# Patient Record
Sex: MALE | Race: WHITE | URBAN - METROPOLITAN AREA
[De-identification: names, ages, dates, MRNs, and addresses within clinical notes are randomized per-mention and may not be internally consistent; named-entity substitution may affect disease eponyms.]

---

## 2020-10-21 ENCOUNTER — PROCEDURE (OUTPATIENT)
Dept: URBAN - METROPOLITAN AREA CLINIC 54 | Facility: CLINIC | Age: 80
End: 2020-10-21
Payer: MEDICARE

## 2020-10-21 PROCEDURE — 92134 CPTRZ OPH DX IMG PST SGM RTA: CPT | Performed by: OPHTHALMOLOGY

## 2020-10-21 PROCEDURE — 67028 INJECTION EYE DRUG: CPT | Performed by: OPHTHALMOLOGY

## 2020-10-21 ASSESSMENT — INTRAOCULAR PRESSURE
OD: 9
OS: 12

## 2020-11-18 ENCOUNTER — PROCEDURE (OUTPATIENT)
Dept: URBAN - METROPOLITAN AREA CLINIC 54 | Facility: CLINIC | Age: 80
End: 2020-11-18
Payer: MEDICARE

## 2020-11-18 PROCEDURE — 92134 CPTRZ OPH DX IMG PST SGM RTA: CPT | Performed by: OPHTHALMOLOGY

## 2020-11-18 PROCEDURE — 67028 INJECTION EYE DRUG: CPT | Performed by: OPHTHALMOLOGY

## 2020-11-18 ASSESSMENT — INTRAOCULAR PRESSURE
OD: 13
OS: 14

## 2020-12-21 ENCOUNTER — OFFICE VISIT (OUTPATIENT)
Dept: URBAN - METROPOLITAN AREA CLINIC 54 | Facility: CLINIC | Age: 80
End: 2020-12-21
Payer: MEDICARE

## 2020-12-21 PROCEDURE — 67028 INJECTION EYE DRUG: CPT | Performed by: OPHTHALMOLOGY

## 2020-12-21 PROCEDURE — 92012 INTRM OPH EXAM EST PATIENT: CPT | Performed by: OPHTHALMOLOGY

## 2020-12-21 PROCEDURE — 92134 CPTRZ OPH DX IMG PST SGM RTA: CPT | Performed by: OPHTHALMOLOGY

## 2020-12-21 ASSESSMENT — INTRAOCULAR PRESSURE
OS: 15
OD: 16

## 2020-12-21 NOTE — IMPRESSION/PLAN
Impression: Central retinal vein occlusion, left eye, with macular edema Plan: He complains  his vision OD is a little bit blurred. 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 He is also s/p Ozurdex 4/29/20. FA with sweeps from 4/13/18 showed perfused CRVO. OCT today shows no IRF/SRF OD and ERM/CME OS. We discussed the findings and natural history. We discussed his vision may be limited by macular ischemia and/or amblyopia. Based on today's findings, I recommend treatment today to reduce the risk of vision loss. He has done well with ozurdex but his IOP is elevated today. Lucentis injected OS without complication after discussing the R/IB/A in detail. We discussed trying eylea as well.  
thanks Ilia CAST 1 month OCT OU; ivania OS#1

## 2021-02-03 ENCOUNTER — PROCEDURE (OUTPATIENT)
Dept: URBAN - METROPOLITAN AREA CLINIC 54 | Facility: CLINIC | Age: 81
End: 2021-02-03
Payer: MEDICARE

## 2021-02-03 PROCEDURE — 92134 CPTRZ OPH DX IMG PST SGM RTA: CPT | Performed by: OPHTHALMOLOGY

## 2021-02-03 PROCEDURE — 67028 INJECTION EYE DRUG: CPT | Performed by: OPHTHALMOLOGY

## 2021-02-03 RX ORDER — LATANOPROST 50 UG/ML
0.005 % SOLUTION OPHTHALMIC
Qty: 5 | Refills: 2 | Status: INACTIVE
Start: 2021-02-03 | End: 2021-03-04

## 2021-02-03 RX ORDER — TIMOLOL MALEATE 5 MG/ML
0.5 % SOLUTION/ DROPS OPHTHALMIC
Qty: 5 | Refills: 2 | Status: INACTIVE
Start: 2021-02-03 | End: 2021-03-04

## 2021-02-03 ASSESSMENT — INTRAOCULAR PRESSURE
OS: 19
OD: 13

## 2021-03-03 ENCOUNTER — PROCEDURE (OUTPATIENT)
Dept: URBAN - METROPOLITAN AREA CLINIC 54 | Facility: CLINIC | Age: 81
End: 2021-03-03
Payer: MEDICARE

## 2021-03-03 PROCEDURE — 67028 INJECTION EYE DRUG: CPT | Performed by: OPHTHALMOLOGY

## 2021-03-03 PROCEDURE — 92134 CPTRZ OPH DX IMG PST SGM RTA: CPT | Performed by: OPHTHALMOLOGY

## 2021-03-03 ASSESSMENT — INTRAOCULAR PRESSURE
OD: 13
OS: 18

## 2021-03-31 ENCOUNTER — PROCEDURE (OUTPATIENT)
Dept: URBAN - METROPOLITAN AREA CLINIC 54 | Facility: CLINIC | Age: 81
End: 2021-03-31
Payer: MEDICARE

## 2021-03-31 PROCEDURE — 92134 CPTRZ OPH DX IMG PST SGM RTA: CPT | Performed by: OPHTHALMOLOGY

## 2021-03-31 PROCEDURE — 67028 INJECTION EYE DRUG: CPT | Performed by: OPHTHALMOLOGY

## 2021-03-31 ASSESSMENT — INTRAOCULAR PRESSURE
OS: 18
OD: 15

## 2021-04-28 ENCOUNTER — OFFICE VISIT (OUTPATIENT)
Dept: URBAN - METROPOLITAN AREA CLINIC 54 | Facility: CLINIC | Age: 81
End: 2021-04-28
Payer: MEDICARE

## 2021-04-28 DIAGNOSIS — H34.8120 CENTRAL RETINAL VEIN OCCLUSION, LEFT EYE, WITH MACULAR EDEMA: Primary | ICD-10-CM

## 2021-04-28 PROCEDURE — 92134 CPTRZ OPH DX IMG PST SGM RTA: CPT | Performed by: OPHTHALMOLOGY

## 2021-04-28 PROCEDURE — 99213 OFFICE O/P EST LOW 20 MIN: CPT | Performed by: OPHTHALMOLOGY

## 2021-04-28 ASSESSMENT — INTRAOCULAR PRESSURE
OD: 12
OS: 16

## 2021-04-28 NOTE — IMPRESSION/PLAN
Impression: Central retinal vein occlusion, left eye, with macular edema Plan: He has persistent but improved CME OS s/p eyles OS x 3. He is also s/p Ozurdex 4/29/20. FA with sweeps from 4/13/18 showed perfused CRVO. OCT today shows no IRF/SRF OD and ERM/CME OS. We discussed the findings and natural history. We discussed his vision may be limited by macular ischemia and/or amblyopia. We discussed observation vs treatment and the R/B of each. I recommend treatment today but he wants to defer an injection and will call us with any new visual changes. His last eylea OS was 3/31/2.  We discussed the need to monitor for NVG
thanks Ilia CAST 1 month OCT OU; poss  eylea OS

## 2021-05-06 ENCOUNTER — OFFICE VISIT (OUTPATIENT)
Dept: URBAN - METROPOLITAN AREA CLINIC 13 | Facility: CLINIC | Age: 81
End: 2021-05-06
Payer: MEDICARE

## 2021-05-06 PROCEDURE — 67028 INJECTION EYE DRUG: CPT | Performed by: OPHTHALMOLOGY

## 2021-05-06 PROCEDURE — 92134 CPTRZ OPH DX IMG PST SGM RTA: CPT | Performed by: OPHTHALMOLOGY

## 2021-05-06 ASSESSMENT — INTRAOCULAR PRESSURE
OS: 17
OD: 11

## 2021-05-06 NOTE — IMPRESSION/PLAN
Impression: Central retinal vein occlusion, left eye, with macular edema Plan: He complains of worse vision OS and has recurrent CME. He is also s/p Ozurdex 4/29/20. FA with sweeps from 4/13/18 showed perfused CRVO. OCT today shows no IRF/SRF OD and ERM/CME OS. We discussed the findings and natural history. We discussed his vision may be limited by macular ischemia and/or amblyopia. I recommend treatment today.  Eylea injected OS without complication after discussing the R/IB/A in detail. 
thanks Ilia CAST 1 month OCT OU; eylea OS

## 2021-06-09 ENCOUNTER — PROCEDURE (OUTPATIENT)
Dept: URBAN - METROPOLITAN AREA CLINIC 54 | Facility: CLINIC | Age: 81
End: 2021-06-09
Payer: MEDICARE

## 2021-06-09 PROCEDURE — 67028 INJECTION EYE DRUG: CPT | Performed by: OPHTHALMOLOGY

## 2021-06-09 PROCEDURE — 92134 CPTRZ OPH DX IMG PST SGM RTA: CPT | Performed by: OPHTHALMOLOGY

## 2021-06-09 ASSESSMENT — INTRAOCULAR PRESSURE
OS: 15
OD: 11

## 2021-07-12 ENCOUNTER — PROCEDURE (OUTPATIENT)
Dept: URBAN - METROPOLITAN AREA CLINIC 54 | Facility: CLINIC | Age: 81
End: 2021-07-12
Payer: MEDICARE

## 2021-07-12 PROCEDURE — 92134 CPTRZ OPH DX IMG PST SGM RTA: CPT | Performed by: OPHTHALMOLOGY

## 2021-07-12 PROCEDURE — 67028 INJECTION EYE DRUG: CPT | Performed by: OPHTHALMOLOGY

## 2021-07-12 ASSESSMENT — INTRAOCULAR PRESSURE
OS: 14
OD: 12

## 2021-08-11 ENCOUNTER — OFFICE VISIT (OUTPATIENT)
Dept: URBAN - METROPOLITAN AREA CLINIC 54 | Facility: CLINIC | Age: 81
End: 2021-08-11
Payer: MEDICARE

## 2021-08-11 DIAGNOSIS — H53.002 AMBLYOPIA OF LEFT EYE: ICD-10-CM

## 2021-08-11 PROCEDURE — 92134 CPTRZ OPH DX IMG PST SGM RTA: CPT | Performed by: OPHTHALMOLOGY

## 2021-08-11 PROCEDURE — 67028 INJECTION EYE DRUG: CPT | Performed by: OPHTHALMOLOGY

## 2021-08-11 PROCEDURE — 99213 OFFICE O/P EST LOW 20 MIN: CPT | Performed by: OPHTHALMOLOGY

## 2021-08-11 ASSESSMENT — INTRAOCULAR PRESSURE
OD: 12
OS: 17

## 2021-08-11 NOTE — IMPRESSION/PLAN
Impression: Central retinal vein occlusion, left eye, with macular edema Plan: He complains of slightly worse vision OD. He has persistent CME OS. He is also s/p Ozurdex 4/29/20. FA with sweeps from 4/13/18 showed perfused CRVO. OCT today shows no IRF/SRF OD and ERM/CME OS. We discussed the findings and natural history. We discussed his vision may be limited by macular ischemia and/or amblyopia and likely will not improve. We discussed treatment vs observation. Based on today's findings, I recommend treatment today to reduce the risk of vision loss. Eylea injected OS without complication after discussing the R/IB/A in detail. We will try to extend the interval and discussed stopping injections. thanks [employer] RTC 6 weeks OCT OU; eylea OS

## 2021-09-22 ENCOUNTER — PROCEDURE (OUTPATIENT)
Dept: URBAN - METROPOLITAN AREA CLINIC 54 | Facility: CLINIC | Age: 81
End: 2021-09-22
Payer: MEDICARE

## 2021-09-22 PROCEDURE — 92134 CPTRZ OPH DX IMG PST SGM RTA: CPT | Performed by: OPHTHALMOLOGY

## 2021-09-22 PROCEDURE — 67028 INJECTION EYE DRUG: CPT | Performed by: OPHTHALMOLOGY

## 2021-09-22 RX ORDER — TIMOLOL 2.56 MG/ML
0.25 % SOLUTION/ DROPS OPHTHALMIC
Qty: 0 | Refills: 0 | Status: INACTIVE
Start: 2021-09-22 | End: 2021-09-22

## 2021-09-22 RX ORDER — LATANOPROST 50 UG/ML
0.005 % SOLUTION OPHTHALMIC
Qty: 0 | Refills: 0 | Status: ACTIVE
Start: 2021-09-22

## 2021-09-22 RX ORDER — TIMOLOL MALEATE 2.5 MG/ML
0.25 % SOLUTION/ DROPS OPHTHALMIC
Qty: 0 | Refills: 0 | Status: INACTIVE
Start: 2021-09-22 | End: 2022-02-16

## 2021-09-22 ASSESSMENT — INTRAOCULAR PRESSURE
OS: 18
OD: 17

## 2021-11-03 ENCOUNTER — PROCEDURE (OUTPATIENT)
Dept: URBAN - METROPOLITAN AREA CLINIC 54 | Facility: CLINIC | Age: 81
End: 2021-11-03
Payer: MEDICARE

## 2021-11-03 PROCEDURE — 67028 INJECTION EYE DRUG: CPT | Performed by: OPHTHALMOLOGY

## 2021-11-03 PROCEDURE — 92134 CPTRZ OPH DX IMG PST SGM RTA: CPT | Performed by: OPHTHALMOLOGY

## 2021-11-03 ASSESSMENT — INTRAOCULAR PRESSURE
OD: 13
OS: 18

## 2022-01-05 ENCOUNTER — OFFICE VISIT (OUTPATIENT)
Dept: URBAN - METROPOLITAN AREA CLINIC 54 | Facility: CLINIC | Age: 82
End: 2022-01-05
Payer: MEDICARE

## 2022-01-05 PROCEDURE — 99213 OFFICE O/P EST LOW 20 MIN: CPT | Performed by: OPHTHALMOLOGY

## 2022-01-05 PROCEDURE — 92134 CPTRZ OPH DX IMG PST SGM RTA: CPT | Performed by: OPHTHALMOLOGY

## 2022-01-05 PROCEDURE — 67028 INJECTION EYE DRUG: CPT | Performed by: OPHTHALMOLOGY

## 2022-01-05 ASSESSMENT — INTRAOCULAR PRESSURE
OS: 20
OD: 12

## 2022-01-05 NOTE — IMPRESSION/PLAN
Impression: Central retinal vein occlusion, left eye, with macular edema Plan: He complains his vision OU is deteriorating. He has persistent CME OS. He is also s/p Ozurdex 4/29/20. FA with sweeps from 4/13/18 showed perfused CRVO. OCT today shows no IRF/SRF OD and ERM/CME OS. We discussed the findings and natural history. We discussed his vision may be limited by macular ischemia and/or amblyopia and likely will not improve. We discussed treatment vs observation. He wants to continue treatment. Based on today's findings, I recommend treatment today to reduce the risk of vision loss. Eylea injected OS without complication after discussing the R/IB/A in detail. We discussed stopping injections as well. thanks [employer] RTC 6 weeks OCT OU; eylea OS; then RTC 6-8 weeks FA/OCT OU (transit OS); poss eylea

## 2022-02-16 ENCOUNTER — PROCEDURE (OUTPATIENT)
Dept: URBAN - METROPOLITAN AREA CLINIC 54 | Facility: CLINIC | Age: 82
End: 2022-02-16
Payer: MEDICARE

## 2022-02-16 PROCEDURE — 67028 INJECTION EYE DRUG: CPT | Performed by: OPHTHALMOLOGY

## 2022-02-16 PROCEDURE — 92134 CPTRZ OPH DX IMG PST SGM RTA: CPT | Performed by: OPHTHALMOLOGY

## 2022-02-16 ASSESSMENT — INTRAOCULAR PRESSURE
OD: 12
OS: 20

## 2022-03-30 ENCOUNTER — OFFICE VISIT (OUTPATIENT)
Dept: URBAN - METROPOLITAN AREA CLINIC 54 | Facility: CLINIC | Age: 82
End: 2022-03-30
Payer: MEDICARE

## 2022-03-30 PROCEDURE — 99214 OFFICE O/P EST MOD 30 MIN: CPT | Performed by: OPHTHALMOLOGY

## 2022-03-30 PROCEDURE — 92235 FLUORESCEIN ANGRPH MLTIFRAME: CPT | Performed by: OPHTHALMOLOGY

## 2022-03-30 PROCEDURE — 92134 CPTRZ OPH DX IMG PST SGM RTA: CPT | Performed by: OPHTHALMOLOGY

## 2022-03-30 ASSESSMENT — INTRAOCULAR PRESSURE
OD: 14
OS: 21

## 2022-03-30 NOTE — IMPRESSION/PLAN
Impression: Central retinal vein occlusion, left eye, with macular edema Plan: He has persistent CME OS despite multiple injections and he feels his vision has not improved. He is also s/p Ozurdex 4/29/20. OCT today shows no IRF/SRF OD and ERM/CME OS. FA today shows no leakage OD and old CRVO with significant capillary dropout and enlarged VIRIDIANA. We discussed the findings and natural history. We discussed his vision is limited by macular ischemia and/or amblyopia and will not improve. I recommend observation. His last injection OS was 2/16/22. We discussed the need to monitor for NVG. thanks [employer] RTC 4-6 weeks OCT OU poss Avastin OS;

## 2022-05-04 ENCOUNTER — OFFICE VISIT (OUTPATIENT)
Dept: URBAN - METROPOLITAN AREA CLINIC 54 | Facility: CLINIC | Age: 82
End: 2022-05-04
Payer: MEDICARE

## 2022-05-04 DIAGNOSIS — H53.002 AMBLYOPIA OF LEFT EYE: ICD-10-CM

## 2022-05-04 PROCEDURE — 92134 CPTRZ OPH DX IMG PST SGM RTA: CPT | Performed by: OPHTHALMOLOGY

## 2022-05-04 PROCEDURE — 67028 INJECTION EYE DRUG: CPT | Performed by: OPHTHALMOLOGY

## 2022-05-04 PROCEDURE — 99214 OFFICE O/P EST MOD 30 MIN: CPT | Performed by: OPHTHALMOLOGY

## 2022-05-04 ASSESSMENT — INTRAOCULAR PRESSURE
OD: 11
OS: 21

## 2022-05-04 NOTE — IMPRESSION/PLAN
Impression: Central retinal vein occlusion, left eye, with macular edema Plan: He has persistent CME and now NVI OS. He is also s/p Ozurdex 4/29/20. OCT today shows no IRF/SRF OD and ERM/CME OS. FA 3/30/21 shows no leakage OD and old CRVO with significant capillary dropout and enlarged VIRIDIANA. We discussed the findings and natural history. We discussed his vision is limited by macular ischemia and/or amblyopia and will not improve. 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 Based on today's findings, I recommend treatment. AVastin injected OS without complication after discussing the R/IB/A in detail. WE discussed the goal of treatment is not to improve his vision but to reduce the risk of NVG. thanks [employer] RTC within 1 month PRP OS

## 2022-05-18 ENCOUNTER — PROCEDURE (OUTPATIENT)
Dept: URBAN - METROPOLITAN AREA CLINIC 54 | Facility: CLINIC | Age: 82
End: 2022-05-18
Payer: MEDICARE

## 2022-05-18 DIAGNOSIS — H34.8120 CENTRAL RETINAL VEIN OCCLUSION, LEFT EYE, WITH MACULAR EDEMA: Primary | ICD-10-CM

## 2022-05-18 PROCEDURE — 67228 TREATMENT X10SV RETINOPATHY: CPT | Performed by: OPHTHALMOLOGY

## 2022-05-18 ASSESSMENT — INTRAOCULAR PRESSURE
OS: 26
OD: 10

## 2022-06-27 ENCOUNTER — OFFICE VISIT (OUTPATIENT)
Dept: URBAN - METROPOLITAN AREA CLINIC 54 | Facility: CLINIC | Age: 82
End: 2022-06-27
Payer: MEDICARE

## 2022-06-27 DIAGNOSIS — H53.002 AMBLYOPIA OF LEFT EYE: ICD-10-CM

## 2022-06-27 DIAGNOSIS — H40.9 GLAUCOMA: ICD-10-CM

## 2022-06-27 DIAGNOSIS — H34.8120 CENTRAL RETINAL VEIN OCCLUSION, LEFT EYE, WITH MACULAR EDEMA: Primary | ICD-10-CM

## 2022-06-27 PROCEDURE — 92134 CPTRZ OPH DX IMG PST SGM RTA: CPT | Performed by: OPHTHALMOLOGY

## 2022-06-27 PROCEDURE — 67028 INJECTION EYE DRUG: CPT | Performed by: OPHTHALMOLOGY

## 2022-06-27 ASSESSMENT — INTRAOCULAR PRESSURE
OD: 11
OS: 14

## 2022-06-27 NOTE — IMPRESSION/PLAN
Impression: Central retinal vein occlusion, left eye, with macular edema
s/p Ozurdex 4/29/20 Plan: He feels his vision is stable. 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 OCT today shows no IRF/SRF OD and ERM/CME OS. FA 3/30/21 shows no leakage OD and old CRVO with significant capillary dropout and enlarged VIRIDIANA. We discussed the findings and natural history. We discussed his vision is limited by macular ischemia and/or amblyopia and will not improve. 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 I recommend treatment. Avastin injected OS without complication after discussing the R/IB/A in detail. We discussed the goal of treatment is not to improve his vision but to reduce the risk of NVG. We will treat/extend. thanks [employer] RTC 2 months OCT OU; poss avastin

## 2022-08-24 ENCOUNTER — OFFICE VISIT (OUTPATIENT)
Dept: URBAN - METROPOLITAN AREA CLINIC 54 | Facility: CLINIC | Age: 82
End: 2022-08-24
Payer: MEDICARE

## 2022-08-24 DIAGNOSIS — H34.8120 CENTRAL RETINAL VEIN OCCLUSION, LEFT EYE, WITH MACULAR EDEMA: Primary | ICD-10-CM

## 2022-08-24 DIAGNOSIS — H40.9 GLAUCOMA: ICD-10-CM

## 2022-08-24 PROCEDURE — 67028 INJECTION EYE DRUG: CPT | Performed by: OPHTHALMOLOGY

## 2022-08-24 PROCEDURE — 92134 CPTRZ OPH DX IMG PST SGM RTA: CPT | Performed by: OPHTHALMOLOGY

## 2022-08-24 ASSESSMENT — INTRAOCULAR PRESSURE
OD: 17
OS: 18

## 2022-08-24 NOTE — IMPRESSION/PLAN
Impression: Central retinal vein occlusion, left eye, with macular edema
s/p Ozurdex 4/29/20 Plan: He feels his vision is unchanged. 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 OCT today shows no IRF/SRF OD and ERM/CME OS. FA 3/30/21 shows no leakage OD and old CRVO with significant capillary dropout and enlarged VIRIDIANA. We discussed the findings and natural history. We discussed his vision is limited by macular ischemia and/or amblyopia and will not improve. 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 I recommend treatment. Avastin injected OS without complication after discussing the R/IB/A in detail. We discussed the goal of treatment is not to improve his vision but to reduce the risk of NVG. We will treat/extend. thanks [employer] [facility] 9-10 weeks OCT OU; poss avastin

## 2022-11-02 ENCOUNTER — OFFICE VISIT (OUTPATIENT)
Dept: URBAN - METROPOLITAN AREA CLINIC 54 | Facility: CLINIC | Age: 82
End: 2022-11-02
Payer: MEDICARE

## 2022-11-02 DIAGNOSIS — H34.8120 CENTRAL RETINAL VEIN OCCLUSION, LEFT EYE, WITH MACULAR EDEMA: Primary | ICD-10-CM

## 2022-11-02 DIAGNOSIS — H40.9 GLAUCOMA: ICD-10-CM

## 2022-11-02 PROCEDURE — 99213 OFFICE O/P EST LOW 20 MIN: CPT | Performed by: OPHTHALMOLOGY

## 2022-11-02 PROCEDURE — 67028 INJECTION EYE DRUG: CPT | Performed by: OPHTHALMOLOGY

## 2022-11-02 PROCEDURE — 92134 CPTRZ OPH DX IMG PST SGM RTA: CPT | Performed by: OPHTHALMOLOGY

## 2022-11-02 ASSESSMENT — INTRAOCULAR PRESSURE
OD: 12
OS: 21

## 2022-11-02 NOTE — IMPRESSION/PLAN
Impression: Central retinal vein occlusion, left eye, with macular edema
s/p Ozurdex 4/29/20 Plan: He complains his vision OU is deteriorating. However, he's doing as well as possible. 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 OCT today shows no IRF/SRF OD and ERM/CME OS. FA 3/30/21 shows no leakage OD and old CRVO with significant capillary dropout and enlarged VIRIDIANA. We discussed the findings and natural history. We discussed his vision is limited by macular ischemia and/or amblyopia and will not improve. 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 Based on today's findings, I recommend treatment to reduce the risk of NVG. Avastin injected OS without complication after discussing the R/IB/A in detail. We discussed the goal of treatment is not to improve his vision but to reduce the risk of pain. We will treat/extend. He will see Dr. Edwin Yeager as well. thanks [facility] RT 11-12 weeks OCT OU; poss avastin

## 2023-01-25 ENCOUNTER — OFFICE VISIT (OUTPATIENT)
Dept: URBAN - METROPOLITAN AREA CLINIC 54 | Facility: CLINIC | Age: 83
End: 2023-01-25
Payer: MEDICARE

## 2023-01-25 DIAGNOSIS — H34.8120 CENTRAL RETINAL VEIN OCCLUSION, LEFT EYE, WITH MACULAR EDEMA: Primary | ICD-10-CM

## 2023-01-25 DIAGNOSIS — H40.9 GLAUCOMA: ICD-10-CM

## 2023-01-25 PROCEDURE — 92134 CPTRZ OPH DX IMG PST SGM RTA: CPT | Performed by: OPHTHALMOLOGY

## 2023-01-25 PROCEDURE — 67028 INJECTION EYE DRUG: CPT | Performed by: OPHTHALMOLOGY

## 2023-01-25 ASSESSMENT — INTRAOCULAR PRESSURE
OS: 20
OD: 16

## 2023-01-25 NOTE — IMPRESSION/PLAN
Impression: Central retinal vein occlusion, left eye, with macular edema
s/p Ozurdex 4/29/20 Plan: He feels his vision is stable. 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 OCT today shows no IRF/SRF OD and ERM/CME OS. FA 3/30/21 shows no leakage OD and old CRVO with significant capillary dropout and enlarged VIRIDIANA. We discussed the findings and natural history. We discussed his vision is limited by macular ischemia and/or amblyopia and will not improve. 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 I recommend treatment to reduce the risk of NVG. Avastin injected OS without complication after discussing the R/IB/A in detail. We discussed the goal of treatment is not to improve his vision but to reduce the risk of pain. We will treat/extend. He will see Dr. Fernandez Zhou as well. thanks [employer] RTC 13-14 weeks OCT OU; poss avastin

## 2023-05-03 ENCOUNTER — OFFICE VISIT (OUTPATIENT)
Dept: URBAN - METROPOLITAN AREA CLINIC 54 | Facility: CLINIC | Age: 83
End: 2023-05-03
Payer: MEDICARE

## 2023-05-03 DIAGNOSIS — H40.9 GLAUCOMA: ICD-10-CM

## 2023-05-03 DIAGNOSIS — H34.8120 CENTRAL RETINAL VEIN OCCLUSION, LEFT EYE, WITH MACULAR EDEMA: Primary | ICD-10-CM

## 2023-05-03 PROCEDURE — 92134 CPTRZ OPH DX IMG PST SGM RTA: CPT | Performed by: OPHTHALMOLOGY

## 2023-05-03 PROCEDURE — 67028 INJECTION EYE DRUG: CPT | Performed by: OPHTHALMOLOGY

## 2023-05-03 PROCEDURE — 99213 OFFICE O/P EST LOW 20 MIN: CPT | Performed by: OPHTHALMOLOGY

## 2023-05-03 ASSESSMENT — INTRAOCULAR PRESSURE
OS: 27
OD: 11

## 2023-05-03 NOTE — IMPRESSION/PLAN
Impression: Central retinal vein occlusion, left eye, with macular edema
s/p Ozurdex 4/29/20 Plan: He complains his vision OU is not good. 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 OCT today shows no IRF/SRF OD and ERM/CME OS. FA 3/30/21 shows no leakage OD and old CRVO with significant capillary dropout and enlarged VIRIDIANA. We discussed the findings and natural history. We discussed his vision is limited by macular ischemia and/or amblyopia and will not improve. 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 Based on today's findings, I recommend treatment to reduce the risk of NVG. Avastin injected OS without complication after discussing the R/IB/A in detail. We discussed the goal of treatment is not to improve his vision but to reduce the risk of pain. We will treat/extend. He will see Dr. Narendra Albert for possible shunt surgery. thanks [employer] RTC 15-16 weeks OCT OU; poss avastin

## 2023-06-09 ENCOUNTER — OFFICE VISIT (OUTPATIENT)
Dept: URBAN - METROPOLITAN AREA CLINIC 13 | Facility: CLINIC | Age: 83
End: 2023-06-09
Payer: MEDICARE

## 2023-06-09 DIAGNOSIS — H40.9 GLAUCOMA: ICD-10-CM

## 2023-06-09 DIAGNOSIS — H34.8120 CENTRAL RETINAL VEIN OCCLUSION, LEFT EYE, WITH MACULAR EDEMA: ICD-10-CM

## 2023-06-09 DIAGNOSIS — H31.302 CHOROIDAL HEMORRHAGE OF LEFT EYE: Primary | ICD-10-CM

## 2023-06-09 PROCEDURE — 99214 OFFICE O/P EST MOD 30 MIN: CPT | Performed by: OPHTHALMOLOGY

## 2023-06-09 PROCEDURE — 76512 OPH US DX B-SCAN: CPT | Performed by: OPHTHALMOLOGY

## 2023-06-09 RX ORDER — ATROPINE SULFATE 10 MG/ML
1 % SOLUTION/ DROPS OPHTHALMIC
Qty: 5 | Refills: 3 | Status: INACTIVE
Start: 2023-06-09 | End: 2023-09-06

## 2023-06-09 ASSESSMENT — INTRAOCULAR PRESSURE: OD: 16

## 2023-06-09 NOTE — IMPRESSION/PLAN
Impression: Central retinal vein occlusion, left eye, with macular edema
s/p Ozurdex 4/29/20
s/p Avastin, last 05/03/23 (DTG) Plan: No view to retina today. Chronic rubeosis. Observe.

## 2023-06-09 NOTE — IMPRESSION/PLAN
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 Plan: Exam and b-scan ultrasonography confirm large (appositional) choroidal hemorrhage s/p glaucoma tube shunt procedure. Discussed findings in detail with patient and wife. Start atropine 1% BID OS. Cont pred forte and post-op antibiotic QID. Wait a minimum of 2 weeks (or until choroidal hemorrhage has liquefied) before proceeding with drainage. Pt understands and accepts plan. 

RTC 1 week DFE OS (DTG)

## 2023-06-21 ENCOUNTER — OFFICE VISIT (OUTPATIENT)
Dept: URBAN - METROPOLITAN AREA CLINIC 54 | Facility: CLINIC | Age: 83
End: 2023-06-21
Payer: MEDICARE

## 2023-06-21 DIAGNOSIS — H40.9 GLAUCOMA: ICD-10-CM

## 2023-06-21 DIAGNOSIS — H34.8120 CENTRAL RETINAL VEIN OCCLUSION, LEFT EYE, WITH MACULAR EDEMA: ICD-10-CM

## 2023-06-21 DIAGNOSIS — H31.302 CHOROIDAL HEMORRHAGE OF LEFT EYE: Primary | ICD-10-CM

## 2023-06-21 PROCEDURE — 99215 OFFICE O/P EST HI 40 MIN: CPT | Performed by: OPHTHALMOLOGY

## 2023-06-21 PROCEDURE — 76512 OPH US DX B-SCAN: CPT | Performed by: OPHTHALMOLOGY

## 2023-06-21 RX ORDER — PREDNISOLONE ACETATE 10 MG/ML
1 % SUSPENSION/ DROPS OPHTHALMIC
Qty: 5 | Refills: 1 | Status: INACTIVE
Start: 2023-06-21 | End: 2023-09-18

## 2023-06-21 RX ORDER — MOXIFLOXACIN 5 MG/ML
0.5 % SOLUTION/ DROPS OPHTHALMIC
Qty: 5 | Refills: 1 | Status: INACTIVE
Start: 2023-06-21 | End: 2023-09-18

## 2023-06-21 ASSESSMENT — INTRAOCULAR PRESSURE
OS: 15
OD: 19

## 2023-06-21 NOTE — IMPRESSION/PLAN
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 Plan: He feels his pain has improved. NLP today. Exam and b-scan ultrasonography confirm large (appositional) choroidal hemorrhage still with significant clot. He is s/p glaucoma tube shunt procedure 6/8/23. Discussed findings in detail with patient and wife. continue atropine 1% BID OS. Cont pred forte and post-op antibiotic QID. discussed poor visual prognosis. We discussed the role of choroidal hemorrhage drain and the R/IB/A in detail and all questions answered.  
PLAN: CHOROIDAL HEMORRHAGE DRAIN OS

## 2023-07-01 ENCOUNTER — POST-OPERATIVE VISIT (OUTPATIENT)
Dept: URBAN - METROPOLITAN AREA CLINIC 7 | Facility: CLINIC | Age: 83
End: 2023-07-01
Payer: MEDICARE

## 2023-07-01 DIAGNOSIS — Z48.810 ENCOUNTER FOR SURGICAL AFTERCARE FOLLOWING SURGERY ON THE SENSE ORGANS: Primary | ICD-10-CM

## 2023-07-01 PROCEDURE — 99024 POSTOP FOLLOW-UP VISIT: CPT | Performed by: OPHTHALMOLOGY

## 2023-07-01 ASSESSMENT — INTRAOCULAR PRESSURE
OD: 19
OS: 10

## 2023-07-05 ENCOUNTER — POST-OPERATIVE VISIT (OUTPATIENT)
Dept: URBAN - METROPOLITAN AREA CLINIC 54 | Facility: CLINIC | Age: 83
End: 2023-07-05
Payer: MEDICARE

## 2023-07-05 DIAGNOSIS — H31.302 CHOROIDAL HEMORRHAGE OF LEFT EYE: Primary | ICD-10-CM

## 2023-07-05 PROCEDURE — 99024 POSTOP FOLLOW-UP VISIT: CPT | Performed by: OPHTHALMOLOGY

## 2023-07-05 ASSESSMENT — INTRAOCULAR PRESSURE
OS: 4
OD: 11

## 2023-07-05 NOTE — IMPRESSION/PLAN
Impression: S/P CHOROIDAL HEMORRHAGE DRAIN OS - 5 Days. Choroidal hemorrhage of left eye  H31.302.  Plan: doing better with improving choroidal hemorrhage
taper off PF
rtc 1 Month OCT OU; POS

## 2023-08-16 ENCOUNTER — POST-OPERATIVE VISIT (OUTPATIENT)
Dept: URBAN - METROPOLITAN AREA CLINIC 54 | Facility: CLINIC | Age: 83
End: 2023-08-16
Payer: MEDICARE

## 2023-08-16 DIAGNOSIS — H31.302: Primary | ICD-10-CM

## 2023-08-16 PROCEDURE — 92134 CPTRZ OPH DX IMG PST SGM RTA: CPT | Performed by: OPHTHALMOLOGY

## 2023-08-16 PROCEDURE — 99024 POSTOP FOLLOW-UP VISIT: CPT | Performed by: OPHTHALMOLOGY

## 2023-08-16 ASSESSMENT — INTRAOCULAR PRESSURE
OS: 18
OD: 13

## 2023-10-18 ENCOUNTER — OFFICE VISIT (OUTPATIENT)
Dept: URBAN - METROPOLITAN AREA CLINIC 54 | Facility: CLINIC | Age: 83
End: 2023-10-18
Payer: COMMERCIAL

## 2023-10-18 DIAGNOSIS — H34.8120 CENTRAL RETINAL VEIN OCCLUSION, LEFT EYE, WITH MACULAR EDEMA: ICD-10-CM

## 2023-10-18 DIAGNOSIS — H31.302 CHOROIDAL HEMORRHAGE OF LEFT EYE: Primary | ICD-10-CM

## 2023-10-18 PROCEDURE — 99213 OFFICE O/P EST LOW 20 MIN: CPT | Performed by: OPHTHALMOLOGY

## 2023-10-18 PROCEDURE — 92134 CPTRZ OPH DX IMG PST SGM RTA: CPT | Performed by: OPHTHALMOLOGY

## 2023-10-18 ASSESSMENT — INTRAOCULAR PRESSURE
OD: 13
OS: 11